# Patient Record
Sex: FEMALE | Race: WHITE | NOT HISPANIC OR LATINO
[De-identification: names, ages, dates, MRNs, and addresses within clinical notes are randomized per-mention and may not be internally consistent; named-entity substitution may affect disease eponyms.]

---

## 2017-10-27 ENCOUNTER — APPOINTMENT (OUTPATIENT)
Dept: OBGYN | Facility: CLINIC | Age: 57
End: 2017-10-27
Payer: COMMERCIAL

## 2017-10-27 VITALS
BODY MASS INDEX: 20.06 KG/M2 | HEIGHT: 62 IN | WEIGHT: 109 LBS | SYSTOLIC BLOOD PRESSURE: 154 MMHG | DIASTOLIC BLOOD PRESSURE: 90 MMHG

## 2017-10-27 DIAGNOSIS — Z86.59 PERSONAL HISTORY OF OTHER MENTAL AND BEHAVIORAL DISORDERS: ICD-10-CM

## 2017-10-27 DIAGNOSIS — Z86.79 PERSONAL HISTORY OF OTHER DISEASES OF THE CIRCULATORY SYSTEM: ICD-10-CM

## 2017-10-27 DIAGNOSIS — Z82.49 FAMILY HISTORY OF ISCHEMIC HEART DISEASE AND OTHER DISEASES OF THE CIRCULATORY SYSTEM: ICD-10-CM

## 2017-10-27 DIAGNOSIS — Z78.9 OTHER SPECIFIED HEALTH STATUS: ICD-10-CM

## 2017-10-27 DIAGNOSIS — Z78.0 ASYMPTOMATIC MENOPAUSAL STATE: ICD-10-CM

## 2017-10-27 PROCEDURE — 99396 PREV VISIT EST AGE 40-64: CPT

## 2017-10-27 RX ORDER — ESTRADIOL 0.1 MG/G
0.1 CREAM VAGINAL
Qty: 1 | Refills: 4 | Status: ACTIVE | COMMUNITY
Start: 2017-10-27 | End: 1900-01-01

## 2017-10-30 LAB — HPV HIGH+LOW RISK DNA PNL CVX: NOT DETECTED

## 2017-11-02 LAB — CYTOLOGY CVX/VAG DOC THIN PREP: NORMAL

## 2018-07-23 PROBLEM — Z86.59 HISTORY OF ANXIETY: Status: RESOLVED | Noted: 2017-10-27 | Resolved: 2018-07-23

## 2018-11-12 ENCOUNTER — APPOINTMENT (OUTPATIENT)
Dept: DERMATOLOGY | Facility: CLINIC | Age: 58
End: 2018-11-12
Payer: COMMERCIAL

## 2018-11-12 PROCEDURE — 99214 OFFICE O/P EST MOD 30 MIN: CPT

## 2018-12-10 ENCOUNTER — APPOINTMENT (OUTPATIENT)
Dept: OBGYN | Facility: CLINIC | Age: 58
End: 2018-12-10
Payer: COMMERCIAL

## 2018-12-10 VITALS
BODY MASS INDEX: 20.06 KG/M2 | WEIGHT: 109 LBS | DIASTOLIC BLOOD PRESSURE: 90 MMHG | HEIGHT: 62 IN | SYSTOLIC BLOOD PRESSURE: 140 MMHG

## 2018-12-10 PROCEDURE — 99213 OFFICE O/P EST LOW 20 MIN: CPT | Mod: 25

## 2018-12-10 PROCEDURE — 99396 PREV VISIT EST AGE 40-64: CPT

## 2018-12-10 RX ORDER — ESTRADIOL 0.1 MG/G
0.1 CREAM VAGINAL
Qty: 1 | Refills: 3 | Status: ACTIVE | COMMUNITY
Start: 2018-12-10 | End: 1900-01-01

## 2018-12-15 LAB
CYTOLOGY CVX/VAG DOC THIN PREP: NORMAL
HPV HIGH+LOW RISK DNA PNL CVX: NOT DETECTED

## 2019-05-30 ENCOUNTER — TRANSCRIPTION ENCOUNTER (OUTPATIENT)
Age: 59
End: 2019-05-30

## 2019-11-11 ENCOUNTER — APPOINTMENT (OUTPATIENT)
Dept: DERMATOLOGY | Facility: CLINIC | Age: 59
End: 2019-11-11
Payer: COMMERCIAL

## 2019-11-11 PROCEDURE — 99213 OFFICE O/P EST LOW 20 MIN: CPT

## 2019-12-11 ENCOUNTER — APPOINTMENT (OUTPATIENT)
Dept: OBGYN | Facility: CLINIC | Age: 59
End: 2019-12-11

## 2020-08-04 ENCOUNTER — TRANSCRIPTION ENCOUNTER (OUTPATIENT)
Age: 60
End: 2020-08-04

## 2020-11-11 ENCOUNTER — RESULT REVIEW (OUTPATIENT)
Age: 60
End: 2020-11-11

## 2020-11-11 ENCOUNTER — APPOINTMENT (OUTPATIENT)
Dept: DERMATOLOGY | Facility: CLINIC | Age: 60
End: 2020-11-11
Payer: COMMERCIAL

## 2020-11-11 PROCEDURE — 99072 ADDL SUPL MATRL&STAF TM PHE: CPT

## 2020-11-11 PROCEDURE — 11102 TANGNTL BX SKIN SINGLE LES: CPT

## 2020-11-11 PROCEDURE — 99214 OFFICE O/P EST MOD 30 MIN: CPT | Mod: 25

## 2021-06-02 ENCOUNTER — APPOINTMENT (OUTPATIENT)
Dept: OBGYN | Facility: CLINIC | Age: 61
End: 2021-06-02
Payer: COMMERCIAL

## 2021-06-02 VITALS
SYSTOLIC BLOOD PRESSURE: 130 MMHG | DIASTOLIC BLOOD PRESSURE: 90 MMHG | WEIGHT: 110 LBS | BODY MASS INDEX: 20.24 KG/M2 | HEIGHT: 62 IN

## 2021-06-02 PROCEDURE — 99072 ADDL SUPL MATRL&STAF TM PHE: CPT

## 2021-06-02 PROCEDURE — 99396 PREV VISIT EST AGE 40-64: CPT

## 2021-06-02 NOTE — PHYSICAL EXAM
[Appropriately responsive] : appropriately responsive [Alert] : alert [No Acute Distress] : no acute distress [No Lymphadenopathy] : no lymphadenopathy [Regular Rate Rhythm] : regular rate rhythm [No Murmurs] : no murmurs [Clear to Auscultation B/L] : clear to auscultation bilaterally [Soft] : soft [Non-tender] : non-tender [Non-distended] : non-distended [No HSM] : No HSM [No Lesions] : no lesions [No Mass] : no mass [Oriented x3] : oriented x3 [Examination Of The Breasts] : a normal appearance [No Masses] : no breast masses were palpable [Labia Majora] : normal [Labia Minora] : normal [Atrophy] : atrophy [Dry Mucosa] : dry mucosa [Normal] : normal [Uterine Adnexae] : normal

## 2021-06-07 LAB
CYTOLOGY CVX/VAG DOC THIN PREP: ABNORMAL
HPV HIGH+LOW RISK DNA PNL CVX: NOT DETECTED

## 2022-09-08 NOTE — PLAN
[FreeTextEntry1] : 61 year old female wwe\par \par 1. Pap done\par 2. Rx screening mammo\par 3. Rx dexa - history of osteoporosis - patient was given a referral to rheum, but did not go.  She will wait to see what her results are this time before making an appointment.  She is not interested in taking meds for osteoporosis. \par 4. Up to date with cologuard testing. \par 5. Annual exam in 1 year
no chest pain and no edema.

## 2022-10-28 ENCOUNTER — APPOINTMENT (OUTPATIENT)
Dept: DERMATOLOGY | Facility: CLINIC | Age: 62
End: 2022-10-28

## 2022-10-28 PROCEDURE — 17110 DESTRUCTION B9 LES UP TO 14: CPT

## 2022-10-28 PROCEDURE — 99213 OFFICE O/P EST LOW 20 MIN: CPT | Mod: 25

## 2022-11-01 ENCOUNTER — NON-APPOINTMENT (OUTPATIENT)
Age: 62
End: 2022-11-01

## 2022-11-01 ENCOUNTER — APPOINTMENT (OUTPATIENT)
Dept: OBGYN | Facility: CLINIC | Age: 62
End: 2022-11-01

## 2022-11-01 VITALS
DIASTOLIC BLOOD PRESSURE: 91 MMHG | HEIGHT: 62 IN | BODY MASS INDEX: 20.43 KG/M2 | WEIGHT: 111 LBS | SYSTOLIC BLOOD PRESSURE: 154 MMHG

## 2022-11-01 DIAGNOSIS — N95.2 POSTMENOPAUSAL ATROPHIC VAGINITIS: ICD-10-CM

## 2022-11-01 PROCEDURE — 99396 PREV VISIT EST AGE 40-64: CPT

## 2022-11-01 NOTE — PHYSICAL EXAM
[Chaperone Declined] : Patient declined chaperone [Appropriately responsive] : appropriately responsive [Alert] : alert [No Acute Distress] : no acute distress [No Lymphadenopathy] : no lymphadenopathy [Regular Rate Rhythm] : regular rate rhythm [No Murmurs] : no murmurs [Clear to Auscultation B/L] : clear to auscultation bilaterally [Soft] : soft [Non-tender] : non-tender [Non-distended] : non-distended [No HSM] : No HSM [No Lesions] : no lesions [No Mass] : no mass [Oriented x3] : oriented x3 [Examination Of The Breasts] : a normal appearance [No Masses] : no breast masses were palpable [Labia Majora] : normal [Labia Minora] : normal [Atrophy] : atrophy [Dry Mucosa] : dry mucosa [Normal] : normal [Absent] : absent [Uterine Adnexae] : absent

## 2022-11-07 LAB
CYTOLOGY CVX/VAG DOC THIN PREP: ABNORMAL
HPV HIGH+LOW RISK DNA PNL CVX: NOT DETECTED

## 2022-12-20 ENCOUNTER — APPOINTMENT (OUTPATIENT)
Dept: OBGYN | Facility: CLINIC | Age: 62
End: 2022-12-20
Payer: COMMERCIAL

## 2022-12-20 VITALS
DIASTOLIC BLOOD PRESSURE: 80 MMHG | BODY MASS INDEX: 20.43 KG/M2 | WEIGHT: 111 LBS | SYSTOLIC BLOOD PRESSURE: 134 MMHG | HEIGHT: 62 IN

## 2022-12-20 DIAGNOSIS — Z87.39 PERSONAL HISTORY OF OTHER DISEASES OF THE MUSCULOSKELETAL SYSTEM AND CONNECTIVE TISSUE: ICD-10-CM

## 2022-12-20 PROCEDURE — 99212 OFFICE O/P EST SF 10 MIN: CPT

## 2023-01-02 ENCOUNTER — NON-APPOINTMENT (OUTPATIENT)
Age: 63
End: 2023-01-02

## 2023-01-30 PROBLEM — Z87.39 HISTORY OF OSTEOPOROSIS: Status: ACTIVE | Noted: 2021-06-02

## 2023-01-30 NOTE — PLAN
[FreeTextEntry1] : 62-year-old female with history of osteoporosis.  Results of her bone density from November 2022 reviewed with the patient.  We discussed her T score.  We discussed the decline in her bone mineral density over the last 4 years.  We discussed that she should continue calcium, vitamin D and weightbearing exercise.  We discussed other supplements such as algae Raji.  The patient is unsure if she would like to see rheumatology.  Referral was given.  She will think about her options.  She will return to the office in November 2023 for her well woman exam.

## 2023-11-27 ENCOUNTER — NON-APPOINTMENT (OUTPATIENT)
Age: 63
End: 2023-11-27

## 2023-11-27 ENCOUNTER — APPOINTMENT (OUTPATIENT)
Dept: OBGYN | Facility: CLINIC | Age: 63
End: 2023-11-27
Payer: MEDICARE

## 2023-11-27 VITALS
BODY MASS INDEX: 20.43 KG/M2 | HEIGHT: 62 IN | DIASTOLIC BLOOD PRESSURE: 70 MMHG | SYSTOLIC BLOOD PRESSURE: 140 MMHG | WEIGHT: 111 LBS

## 2023-11-27 DIAGNOSIS — Z01.419 ENCOUNTER FOR GYNECOLOGICAL EXAMINATION (GENERAL) (ROUTINE) W/OUT ABNORMAL FINDINGS: ICD-10-CM

## 2023-11-27 PROCEDURE — 99397 PER PM REEVAL EST PAT 65+ YR: CPT | Mod: GY

## 2023-11-27 PROCEDURE — G0101: CPT

## 2023-11-29 ENCOUNTER — APPOINTMENT (OUTPATIENT)
Dept: DERMATOLOGY | Facility: CLINIC | Age: 63
End: 2023-11-29
Payer: MEDICARE

## 2023-11-29 PROCEDURE — 99214 OFFICE O/P EST MOD 30 MIN: CPT

## 2023-11-30 LAB — CYTOLOGY CVX/VAG DOC THIN PREP: ABNORMAL

## 2024-04-14 NOTE — HISTORY OF PRESENT ILLNESS
[FreeTextEntry1] : 62-year-old female presents to discuss results of bone density.  Her bone density in 2018 showed osteoporosis and she was given a referral to rheumatology.  She did not go to the appointment and states she will not take medication.  She had a bone density test done in November 2022 that showed osteoporosis that has gotten worse.  She is taking calcium and vitamin D.  She is engaging in weightbearing exercise. - - -

## 2024-05-22 ENCOUNTER — NON-APPOINTMENT (OUTPATIENT)
Age: 64
End: 2024-05-22

## 2024-12-02 ENCOUNTER — APPOINTMENT (OUTPATIENT)
Dept: DERMATOLOGY | Facility: CLINIC | Age: 64
End: 2024-12-02
Payer: MEDICARE

## 2024-12-02 PROCEDURE — 99213 OFFICE O/P EST LOW 20 MIN: CPT

## 2024-12-09 ENCOUNTER — APPOINTMENT (OUTPATIENT)
Dept: OBGYN | Facility: CLINIC | Age: 64
End: 2024-12-09

## 2024-12-09 ENCOUNTER — NON-APPOINTMENT (OUTPATIENT)
Age: 64
End: 2024-12-09

## 2024-12-09 VITALS
DIASTOLIC BLOOD PRESSURE: 86 MMHG | HEIGHT: 62 IN | SYSTOLIC BLOOD PRESSURE: 150 MMHG | WEIGHT: 105 LBS | BODY MASS INDEX: 19.32 KG/M2

## 2024-12-09 PROCEDURE — 99387 INIT PM E/M NEW PAT 65+ YRS: CPT | Mod: GY

## 2024-12-09 PROCEDURE — G0101: CPT
